# Patient Record
Sex: MALE | Race: WHITE
[De-identification: names, ages, dates, MRNs, and addresses within clinical notes are randomized per-mention and may not be internally consistent; named-entity substitution may affect disease eponyms.]

---

## 2018-06-25 ENCOUNTER — HOSPITAL ENCOUNTER (EMERGENCY)
Dept: HOSPITAL 83 - ED | Age: 48
Discharge: HOME | End: 2018-06-25
Payer: COMMERCIAL

## 2018-06-25 VITALS — HEIGHT: 70 IN | WEIGHT: 188 LBS | BODY MASS INDEX: 26.92 KG/M2

## 2018-06-25 VITALS — BODY MASS INDEX: 26.92 KG/M2 | WEIGHT: 188 LBS | HEIGHT: 70 IN
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
## 2018-06-25 DIAGNOSIS — S51.851A: Primary | ICD-10-CM
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
## 2018-06-27 LAB — HEPATITIS C VIRUS ANTIBODY: <0.1 S/CO (ref 0–0.9)

## 2019-01-16 ENCOUNTER — HOSPITAL ENCOUNTER (EMERGENCY)
Dept: HOSPITAL 83 - ED | Age: 49
Discharge: HOME | End: 2019-01-16
Payer: COMMERCIAL

## 2019-01-16 VITALS — WEIGHT: 192 LBS | BODY MASS INDEX: 36.25 KG/M2 | HEIGHT: 60.98 IN
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
## 2019-01-16 DIAGNOSIS — H57.89: Primary | ICD-10-CM

## 2019-01-16 DIAGNOSIS — H53.8: ICD-10-CM
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
## 2019-01-16 DIAGNOSIS — H57.13: ICD-10-CM
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
## 2019-07-17 ENCOUNTER — HOSPITAL ENCOUNTER (EMERGENCY)
Dept: HOSPITAL 83 - ED | Age: 49
Discharge: HOME | End: 2019-07-17
Payer: COMMERCIAL

## 2019-07-17 VITALS — HEIGHT: 60 IN | WEIGHT: 192 LBS

## 2019-07-17 DIAGNOSIS — Z79.899: ICD-10-CM

## 2019-07-17 DIAGNOSIS — Z79.2: ICD-10-CM

## 2019-07-17 DIAGNOSIS — L25.9: Primary | ICD-10-CM

## 2019-10-24 ENCOUNTER — HOSPITAL ENCOUNTER (INPATIENT)
Dept: HOSPITAL 83 - ED | Age: 49
LOS: 3 days | Discharge: HOME | DRG: 282 | End: 2019-10-27
Attending: INTERNAL MEDICINE | Admitting: INTERNAL MEDICINE
Payer: COMMERCIAL

## 2019-10-24 VITALS — DIASTOLIC BLOOD PRESSURE: 64 MMHG | SYSTOLIC BLOOD PRESSURE: 118 MMHG

## 2019-10-24 VITALS — BODY MASS INDEX: 27.3 KG/M2 | DIASTOLIC BLOOD PRESSURE: 71 MMHG | WEIGHT: 195 LBS | HEIGHT: 70.98 IN

## 2019-10-24 VITALS — SYSTOLIC BLOOD PRESSURE: 126 MMHG | DIASTOLIC BLOOD PRESSURE: 55 MMHG

## 2019-10-24 VITALS — SYSTOLIC BLOOD PRESSURE: 144 MMHG | DIASTOLIC BLOOD PRESSURE: 76 MMHG

## 2019-10-24 DIAGNOSIS — F31.9: ICD-10-CM

## 2019-10-24 DIAGNOSIS — E78.1: ICD-10-CM

## 2019-10-24 DIAGNOSIS — N50.819: ICD-10-CM

## 2019-10-24 DIAGNOSIS — D72.829: ICD-10-CM

## 2019-10-24 DIAGNOSIS — Z83.3: ICD-10-CM

## 2019-10-24 DIAGNOSIS — F41.9: ICD-10-CM

## 2019-10-24 DIAGNOSIS — K85.90: Primary | ICD-10-CM

## 2019-10-24 DIAGNOSIS — F17.200: ICD-10-CM

## 2019-10-24 DIAGNOSIS — R00.1: ICD-10-CM

## 2019-10-24 DIAGNOSIS — E83.41: ICD-10-CM

## 2019-10-24 DIAGNOSIS — Z71.6: ICD-10-CM

## 2019-10-24 DIAGNOSIS — E87.8: ICD-10-CM

## 2019-10-24 LAB
ALBUMIN SERPL-MCNC: 3.9 GM/DL (ref 3.1–4.5)
ALP SERPL-CCNC: 74 U/L (ref 45–117)
ALT SERPL W P-5'-P-CCNC: 24 U/L (ref 12–78)
APPEARANCE UR: CLEAR
APTT PPP: 25.6 SECONDS (ref 20–32.1)
AST SERPL-CCNC: 12 IU/L (ref 3–35)
BACTERIA #/AREA URNS HPF: (no result) /[HPF]
BASOPHILS # BLD AUTO: 0 10*3/UL (ref 0–0.1)
BASOPHILS NFR BLD AUTO: 0.1 % (ref 0–1)
BILIRUB UR QL STRIP: NEGATIVE
BUN SERPL-MCNC: 18 MG/DL (ref 7–24)
CHLORIDE SERPL-SCNC: 110 MMOL/L (ref 98–107)
COLOR UR: YELLOW
CREAT SERPL-MCNC: 0.96 MG/DL (ref 0.7–1.3)
EOSINOPHIL # BLD AUTO: 0 10*3/UL (ref 0–0.4)
EOSINOPHIL # BLD AUTO: 0.1 % (ref 1–4)
ERYTHROCYTE [DISTWIDTH] IN BLOOD BY AUTOMATED COUNT: 12.6 % (ref 0–14.5)
GLUCOSE UR QL: NEGATIVE
HCT VFR BLD AUTO: 50 % (ref 42–52)
HGB BLD-MCNC: 16.8 G/DL (ref 14–18)
HGB UR QL STRIP: NEGATIVE
INR BLD: 0.9 (ref 2–3.5)
KETONES UR QL STRIP: NEGATIVE
LEUKOCYTE ESTERASE UR QL STRIP: NEGATIVE
LIPASE SERPL-CCNC: 1803 U/L (ref 73–393)
LYMPHOCYTES # BLD AUTO: 2.4 10*3/UL (ref 1.3–4.4)
LYMPHOCYTES NFR BLD AUTO: 15.2 % (ref 27–41)
MCH RBC QN AUTO: 30.7 PG (ref 27–31)
MCHC RBC AUTO-ENTMCNC: 33.6 G/DL (ref 33–37)
MCV RBC AUTO: 91.4 FL (ref 80–94)
MONOCYTES # BLD AUTO: 1.1 10*3/UL (ref 0.1–1)
MONOCYTES NFR BLD MANUAL: 6.8 % (ref 3–9)
MUCOUS THREADS URNS QL MICRO: (no result)
NEUT #: 12.1 10*3/UL (ref 2.3–7.9)
NEUT %: 77.2 % (ref 47–73)
NITRITE UR QL STRIP: NEGATIVE
NRBC BLD QL AUTO: 0 % (ref 0–0)
PH UR STRIP: 6 [PH] (ref 5–9)
PLATELET # BLD AUTO: 338 10*3/UL (ref 130–400)
PMV BLD AUTO: 10.9 FL (ref 9.6–12.3)
POTASSIUM SERPL-SCNC: 4 MMOL/L (ref 3.5–5.1)
PROT SERPL-MCNC: 7.3 GM/DL (ref 6.4–8.2)
RBC # BLD AUTO: 5.47 10*6/UL (ref 4.5–5.9)
RBC #/AREA URNS HPF: (no result) RBC/HPF (ref 0–2)
SODIUM SERPL-SCNC: 140 MMOL/L (ref 136–145)
SP GR UR: 1.02 (ref 1–1.03)
TROPONIN I SERPL-MCNC: < 0.015 NG/ML (ref ?–0.04)
UROBILINOGEN UR STRIP-MCNC: 0.2 E.U./DL (ref 0.2–1)
WBC #/AREA URNS HPF: (no result) WBC/HPF (ref 0–5)
WBC NRBC COR # BLD AUTO: 15.7 10*3/UL (ref 4.8–10.8)

## 2019-10-24 NOTE — NUR
PT STATES THAT THE MEDICATION HAS IMPROVED HIS PAIN SLIGHTLY AT THIS TIME. BED
IS IN LOW POSITION. WILL CONTINUE TO MONITOR.

## 2019-10-24 NOTE — NUR
A 49, admitted to 5E, under the
services of SHELLY Pal MD with a diagnosis of PANCREATITIS.
Chief complaint is N/V, EPIGASTRIC PAIN.
Patient arrived via stretcher from ER.
Monitor applied. Initial assessment completed.
Vital signs taken and recorded.
SHELLY PAL MD notified of admission to the unit.
Orders received.
See assessment for past medical history, medications
and allergies.
Patient and/or family oriented to unit.
Clothing/patient valuable form completed.
 
GAYE BRANCH

## 2019-10-24 NOTE — NUR
PATIENT STATES PRESCRIBED MEDS FOR BIPOLAR DISORDER BUT DOES NOT TAKE THEM.
DENIES ANY OTHER HOME MEDICATIONS.

## 2019-10-24 NOTE — NUR
PT RESTING IN BED UNDER WARM BLANKET. NO COMPLAINTS AT THIS TIME. BED IN LOW
POSITION CALL BELL IS WITHIN REACH. SIDE RAILS UP. WILL CONTINUE TO MONITOR.
PENDING CT RESULTS.

## 2019-10-24 NOTE — NUR
PT VS ARE STABLE AT THIS TIME. THE MEDICATION HAS TAKEN THE "EDGE" AWAY. PT IS
DRINKING FOR HIS CT. BED IS IN LOW POSITION. CALL BELL WITHIN REACH. WILL
CONTINUE TO MONITOR.

## 2019-10-25 VITALS — DIASTOLIC BLOOD PRESSURE: 65 MMHG

## 2019-10-25 VITALS — SYSTOLIC BLOOD PRESSURE: 115 MMHG | DIASTOLIC BLOOD PRESSURE: 72 MMHG

## 2019-10-25 VITALS — DIASTOLIC BLOOD PRESSURE: 69 MMHG

## 2019-10-25 VITALS — DIASTOLIC BLOOD PRESSURE: 62 MMHG

## 2019-10-25 LAB
ALBUMIN SERPL-MCNC: 3.6 GM/DL (ref 3.1–4.5)
ALP SERPL-CCNC: 69 U/L (ref 45–117)
ALT SERPL W P-5'-P-CCNC: 22 U/L (ref 12–78)
AST SERPL-CCNC: 8 IU/L (ref 3–35)
BASOPHILS # BLD AUTO: 0 10*3/UL (ref 0–0.1)
BASOPHILS NFR BLD AUTO: 0.1 % (ref 0–1)
BUN SERPL-MCNC: 14 MG/DL (ref 7–24)
CHLORIDE SERPL-SCNC: 109 MMOL/L (ref 98–107)
CHOLEST SERPL-MCNC: 170 MG/DL (ref ?–200)
CREAT SERPL-MCNC: 0.82 MG/DL (ref 0.7–1.3)
EOSINOPHIL # BLD AUTO: 0 % (ref 1–4)
EOSINOPHIL # BLD AUTO: 0 10*3/UL (ref 0–0.4)
ERYTHROCYTE [DISTWIDTH] IN BLOOD BY AUTOMATED COUNT: 13 % (ref 0–14.5)
HCT VFR BLD AUTO: 48.2 % (ref 42–52)
HDLC SERPL-MCNC: 31 MG/DL (ref 40–60)
HGB BLD-MCNC: 15.7 G/DL (ref 14–18)
LDLC SERPL DIRECT ASSAY-MCNC: 106 MG/DL (ref 9–159)
LIPASE SERPL-CCNC: 931 U/L (ref 73–393)
LYMPHOCYTES # BLD AUTO: 3 10*3/UL (ref 1.3–4.4)
LYMPHOCYTES NFR BLD AUTO: 17.9 % (ref 27–41)
MCH RBC QN AUTO: 31.2 PG (ref 27–31)
MCHC RBC AUTO-ENTMCNC: 32.6 G/DL (ref 33–37)
MCV RBC AUTO: 95.6 FL (ref 80–94)
MONOCYTES # BLD AUTO: 1.2 10*3/UL (ref 0.1–1)
MONOCYTES NFR BLD MANUAL: 6.9 % (ref 3–9)
NEUT #: 12.5 10*3/UL (ref 2.3–7.9)
NEUT %: 74.5 % (ref 47–73)
NRBC BLD QL AUTO: 0 10*3/UL (ref 0–0)
PHOSPHATE SERPL-MCNC: 3.5 MG/DL (ref 2.5–4.9)
PLATELET # BLD AUTO: 309 10*3/UL (ref 130–400)
PMV BLD AUTO: 11.2 FL (ref 9.6–12.3)
POTASSIUM SERPL-SCNC: 4.3 MMOL/L (ref 3.5–5.1)
PROT SERPL-MCNC: 6.6 GM/DL (ref 6.4–8.2)
RBC # BLD AUTO: 5.04 10*6/UL (ref 4.5–5.9)
SODIUM SERPL-SCNC: 140 MMOL/L (ref 136–145)
TRIGL SERPL-MCNC: 165 MG/DL (ref ?–150)
VLDLC SERPL CALC-MCNC: 33 MG/DL (ref 6–40)
WBC NRBC COR # BLD AUTO: 16.8 10*3/UL (ref 4.8–10.8)
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
## 2019-10-25 NOTE — NUR
Dr. Guzman in and examined pt. Explained again to pt that due to elevated
lipase that he is not allowed to have anything at this time to eat or drink.
Explained to pt that this is treatment and that he is also receiving ivf.
Notified that pt will have labs rechecked in AM and that they will go from
there. Pt verbalized understanding. Pt also c/o testicular pain. States he was
treated in past for this by Dr. Vaughn. Dr. Guzman examined pt for this as
well. States to order testicular us to r/o hydrocele.

## 2019-10-25 NOTE — NUR
ATTEMPTED TO CALL DR. MORRIS FOR NOTIFICATION OF CONSULT. NO ANSWER LEFT
MESSAGE ON DR. MORRIS CELL THAT HE HAD A NEW CONSULT, ASKED HIM TO CALL 5E TO
OBTAIN DETAILS OF CONSULT. NO SENSITIVE INFORMATION WAS LEFT ON MESSAGE JUST
THAT THERE WAS A NEW CONSULT.
 
HECTOR ULRICH

## 2019-10-25 NOTE — NUR
Pt medicated with morphine for abdominal cramping and pain. Medicated with
zofran iv per prn order for complaints of nausea.

## 2019-10-25 NOTE — NUR
PATIENT RESTING ON BACK, DENIES NEEDS AT THIS TIME. PAIN IS MANAGED, DENIES
ANY N/V. IVF INFUSING PER ORDERS. BED IN LOWEST LOCKED POS, CALL LIGHT WITHIN
REACH, SIDE RAILS UP X2.

## 2019-10-26 VITALS — DIASTOLIC BLOOD PRESSURE: 62 MMHG | SYSTOLIC BLOOD PRESSURE: 113 MMHG

## 2019-10-26 VITALS — SYSTOLIC BLOOD PRESSURE: 118 MMHG | DIASTOLIC BLOOD PRESSURE: 79 MMHG

## 2019-10-26 VITALS — DIASTOLIC BLOOD PRESSURE: 62 MMHG

## 2019-10-26 VITALS — DIASTOLIC BLOOD PRESSURE: 67 MMHG

## 2019-10-26 LAB
ALBUMIN SERPL-MCNC: 3.4 GM/DL (ref 3.1–4.5)
ALP SERPL-CCNC: 62 U/L (ref 45–117)
ALT SERPL W P-5'-P-CCNC: 20 U/L (ref 12–78)
AST SERPL-CCNC: 5 IU/L (ref 3–35)
BASOPHILS # BLD AUTO: 0 10*3/UL (ref 0–0.1)
BASOPHILS NFR BLD AUTO: 0.1 % (ref 0–1)
BUN SERPL-MCNC: 17 MG/DL (ref 7–24)
CHLORIDE SERPL-SCNC: 111 MMOL/L (ref 98–107)
CREAT SERPL-MCNC: 0.79 MG/DL (ref 0.7–1.3)
EOSINOPHIL # BLD AUTO: 0 10*3/UL (ref 0–0.4)
EOSINOPHIL # BLD AUTO: 0.1 % (ref 1–4)
ERYTHROCYTE [DISTWIDTH] IN BLOOD BY AUTOMATED COUNT: 12.6 % (ref 0–14.5)
HCT VFR BLD AUTO: 45.4 % (ref 42–52)
HGB BLD-MCNC: 15.2 G/DL (ref 14–18)
LIPASE SERPL-CCNC: 80 U/L (ref 73–393)
LYMPHOCYTES # BLD AUTO: 2.8 10*3/UL (ref 1.3–4.4)
LYMPHOCYTES NFR BLD AUTO: 21 % (ref 27–41)
MCH RBC QN AUTO: 31.8 PG (ref 27–31)
MCHC RBC AUTO-ENTMCNC: 33.5 G/DL (ref 33–37)
MCV RBC AUTO: 95 FL (ref 80–94)
MONOCYTES # BLD AUTO: 1 10*3/UL (ref 0.1–1)
MONOCYTES NFR BLD MANUAL: 7.2 % (ref 3–9)
NEUT #: 9.5 10*3/UL (ref 2.3–7.9)
NEUT %: 71.2 % (ref 47–73)
NRBC BLD QL AUTO: 0 % (ref 0–0)
PLATELET # BLD AUTO: 264 10*3/UL (ref 130–400)
PMV BLD AUTO: 11 FL (ref 9.6–12.3)
POTASSIUM SERPL-SCNC: 4.2 MMOL/L (ref 3.5–5.1)
PROT SERPL-MCNC: 6.4 GM/DL (ref 6.4–8.2)
RBC # BLD AUTO: 4.78 10*6/UL (ref 4.5–5.9)
SODIUM SERPL-SCNC: 141 MMOL/L (ref 136–145)
WBC NRBC COR # BLD AUTO: 13.4 10*3/UL (ref 4.8–10.8)

## 2019-10-26 NOTE — NUR
Notified pt of new orders for protonix and given at this time. Notified pt
that Dr. Gutierrez stated to me that he will be in later this evening.

## 2019-10-26 NOTE — NUR
Pt c/o heartburn and indigestion after clear liquids. Denies nausea or pain at
this time. Asking if the Dr will be in today.

## 2019-10-26 NOTE — NUR
Notified Dr. Gutierrez of pt c/o indigestion and heart burn after clear liquids.
Order received for protonix iv. Pt had also asked if Dr. Gutierrez would be in to
see him today. I notified Dr. Gutierrez of this and Dr. Gutierrez states he will be in
later this evening.

## 2019-10-27 VITALS — SYSTOLIC BLOOD PRESSURE: 127 MMHG | DIASTOLIC BLOOD PRESSURE: 71 MMHG

## 2019-10-27 VITALS — DIASTOLIC BLOOD PRESSURE: 71 MMHG

## 2019-10-27 LAB
ALBUMIN SERPL-MCNC: 3.6 GM/DL (ref 3.1–4.5)
ALP SERPL-CCNC: 67 U/L (ref 45–117)
ALT SERPL W P-5'-P-CCNC: 40 U/L (ref 12–78)
AST SERPL-CCNC: 20 IU/L (ref 3–35)
BASOPHILS # BLD AUTO: 0 10*3/UL (ref 0–0.1)
BASOPHILS NFR BLD AUTO: 0.2 % (ref 0–1)
BUN SERPL-MCNC: 16 MG/DL (ref 7–24)
CHLORIDE SERPL-SCNC: 109 MMOL/L (ref 98–107)
CREAT SERPL-MCNC: 0.89 MG/DL (ref 0.7–1.3)
EOSINOPHIL # BLD AUTO: 0 10*3/UL (ref 0–0.4)
EOSINOPHIL # BLD AUTO: 0.1 % (ref 1–4)
ERYTHROCYTE [DISTWIDTH] IN BLOOD BY AUTOMATED COUNT: 12.4 % (ref 0–14.5)
HCT VFR BLD AUTO: 46.4 % (ref 42–52)
HGB BLD-MCNC: 15.6 G/DL (ref 14–18)
LYMPHOCYTES # BLD AUTO: 2.9 10*3/UL (ref 1.3–4.4)
LYMPHOCYTES NFR BLD AUTO: 21.2 % (ref 27–41)
MCH RBC QN AUTO: 31.4 PG (ref 27–31)
MCHC RBC AUTO-ENTMCNC: 33.6 G/DL (ref 33–37)
MCV RBC AUTO: 93.4 FL (ref 80–94)
MONOCYTES # BLD AUTO: 1.3 10*3/UL (ref 0.1–1)
MONOCYTES NFR BLD MANUAL: 9.1 % (ref 3–9)
NEUT #: 9.6 10*3/UL (ref 2.3–7.9)
NEUT %: 68.9 % (ref 47–73)
NRBC BLD QL AUTO: 0 % (ref 0–0)
PLATELET # BLD AUTO: 288 10*3/UL (ref 130–400)
PMV BLD AUTO: 11.4 FL (ref 9.6–12.3)
POTASSIUM SERPL-SCNC: 3.9 MMOL/L (ref 3.5–5.1)
PROT SERPL-MCNC: 6.8 GM/DL (ref 6.4–8.2)
RBC # BLD AUTO: 4.97 10*6/UL (ref 4.5–5.9)
SODIUM SERPL-SCNC: 139 MMOL/L (ref 136–145)
WBC NRBC COR # BLD AUTO: 13.9 10*3/UL (ref 4.8–10.8)

## 2019-10-27 NOTE — NUR
Discharge instructions reviewed with patient/family. Patient receptive and
verbalizes understanding. Follow-up care arranged. Written instructions given
to patient/family. HEPLOCK REMOVED 2X2 APPLIED. HOLTER MONITOR REMOVED. PT
AMBULATORY OFF THE FLOOR FOR DISCHARGE.
MONICA JACOBSON

## 2019-10-27 NOTE — NUR
CALLED DR. MATSON MADE AWARE PROTONIX RX THAT WENT TO PHARMACY WAS IV. HE
STATES HE TALKED WITH PHARMACY ALREADY AND TOOK CARE OF IT.

## 2019-10-27 NOTE — NUR
PT SITTING UP IN BED. RESP-EASY AND REGULAR. NO C/O AT THIS TIME. IVF INFUSING
WITH NO PROBLEM. CALL LIGHT IN REACH. SEE SHIFT ASSESSMENT.

## 2019-10-27 NOTE — NUR
DR. KELLY ON FLOOR TO SEE PT. AWARE PT ORTHOSTATIC BP THAT WAS POSITIVE. PT
C/O DIZZINESS WITH MOVEMENT. CALL LIGHT IN REACH.

## 2019-10-27 NOTE — NUR
CALLED DR. MATSON, PT REQUESTING NICOTROL INHALER AND ALSO WANTING TO HAVE
REGULAR DIET. TOLERATED FULL LIQUID WITH NO PROBLEM. ORDERS TAKEN AND
REVIEWED.

## 2019-10-27 NOTE — NUR
IV started left forearm with #22 protective cath after 1  attempts.
Site prepped with Chloroprep.
Sterile dressing applied. Patient tolerated procedure well.
IV infusing at 10O
  cc/hr.
 
GUNJAN SEAY

## 2020-01-16 ENCOUNTER — HOSPITAL ENCOUNTER (OUTPATIENT)
Dept: HOSPITAL 83 - US | Age: 50
Discharge: HOME | End: 2020-01-16
Attending: INTERNAL MEDICINE
Payer: COMMERCIAL

## 2020-01-16 DIAGNOSIS — K76.0: Primary | ICD-10-CM

## 2020-02-20 ENCOUNTER — HOSPITAL ENCOUNTER (EMERGENCY)
Dept: HOSPITAL 83 - ED | Age: 50
Discharge: HOME | End: 2020-02-20
Payer: COMMERCIAL

## 2020-02-20 VITALS — WEIGHT: 198 LBS | BODY MASS INDEX: 28.35 KG/M2 | HEIGHT: 70 IN

## 2020-02-20 DIAGNOSIS — F17.200: ICD-10-CM

## 2020-02-20 DIAGNOSIS — R11.2: ICD-10-CM

## 2020-02-20 DIAGNOSIS — J20.9: Primary | ICD-10-CM

## 2020-02-20 LAB
ALBUMIN SERPL-MCNC: 4.2 GM/DL (ref 3.1–4.5)
ALP SERPL-CCNC: 101 U/L (ref 45–117)
ALT SERPL W P-5'-P-CCNC: 23 U/L (ref 12–78)
APPEARANCE UR: CLEAR
APTT PPP: 28.5 SECONDS (ref 20–32.1)
AST SERPL-CCNC: 11 IU/L (ref 3–35)
BASOPHILS # BLD AUTO: 1 % (ref 0–1)
BILIRUB UR QL STRIP: NEGATIVE
BUN SERPL-MCNC: 9 MG/DL (ref 7–24)
BURR CELLS BLD QL SMEAR: (no result)
CHLORIDE SERPL-SCNC: 106 MMOL/L (ref 98–107)
COLOR UR: YELLOW
CREAT SERPL-MCNC: 0.98 MG/DL (ref 0.7–1.3)
EPI CELLS #/AREA URNS HPF: (no result) /[HPF]
ERYTHROCYTE [DISTWIDTH] IN BLOOD BY AUTOMATED COUNT: 12.5 % (ref 0–14.5)
GLUCOSE UR QL: NEGATIVE
HCT VFR BLD AUTO: 51.1 % (ref 42–52)
HGB BLD-MCNC: 17.1 G/DL (ref 14–18)
HGB UR QL STRIP: NEGATIVE
INR BLD: 0.9 (ref 2–3.5)
KETONES UR QL STRIP: NEGATIVE
LEUKOCYTE ESTERASE UR QL STRIP: NEGATIVE
LIPASE SERPL-CCNC: 138 U/L (ref 73–393)
MCH RBC QN AUTO: 31.4 PG (ref 27–31)
MCHC RBC AUTO-ENTMCNC: 33.5 G/DL (ref 33–37)
MCV RBC AUTO: 93.9 FL (ref 80–94)
NITRITE UR QL STRIP: NEGATIVE
NRBC BLD QL AUTO: 0 10*3/UL (ref 0–0)
PH UR STRIP: 7 [PH] (ref 5–9)
PLATELET # BLD AUTO: 264 10*3/UL (ref 130–400)
PLATELET SUFFICIENCY: NORMAL
PMV BLD AUTO: 11 FL (ref 9.6–12.3)
POTASSIUM SERPL-SCNC: 3.7 MMOL/L (ref 3.5–5.1)
PROT SERPL-MCNC: 7.8 GM/DL (ref 6.4–8.2)
RBC # BLD AUTO: 5.44 10*6/UL (ref 4.5–5.9)
SODIUM SERPL-SCNC: 139 MMOL/L (ref 136–145)
SP GR UR: 1 (ref 1–1.03)
TOTAL CELLS COUNTED: 100 #CELLS
TROPONIN I SERPL-MCNC: < 0.015 NG/ML (ref ?–0.04)
UROBILINOGEN UR STRIP-MCNC: 0.2 E.U./DL (ref 0.2–1)
WBC NRBC COR # BLD AUTO: 11.2 10*3/UL (ref 4.8–10.8)

## 2020-02-24 ENCOUNTER — HOSPITAL ENCOUNTER (EMERGENCY)
Dept: HOSPITAL 83 - ED | Age: 50
Discharge: HOME | End: 2020-02-24
Payer: COMMERCIAL

## 2020-02-24 VITALS — HEIGHT: 71.97 IN | BODY MASS INDEX: 25.73 KG/M2 | WEIGHT: 190 LBS

## 2020-02-24 DIAGNOSIS — L23.7: Primary | ICD-10-CM

## 2020-03-01 ENCOUNTER — HOSPITAL ENCOUNTER (EMERGENCY)
Dept: HOSPITAL 83 - ED | Age: 50
Discharge: HOME | End: 2020-03-01
Payer: COMMERCIAL

## 2020-03-01 VITALS — WEIGHT: 190 LBS | BODY MASS INDEX: 26.6 KG/M2 | HEIGHT: 70.98 IN

## 2020-03-01 DIAGNOSIS — L23.9: Primary | ICD-10-CM

## 2020-03-01 DIAGNOSIS — Z79.2: ICD-10-CM

## 2020-03-01 DIAGNOSIS — Z79.899: ICD-10-CM

## 2020-06-11 ENCOUNTER — HOSPITAL ENCOUNTER (EMERGENCY)
Dept: HOSPITAL 83 - ED | Age: 50
Discharge: HOME | End: 2020-06-11
Payer: COMMERCIAL

## 2020-06-11 VITALS — BODY MASS INDEX: 25.2 KG/M2 | WEIGHT: 180 LBS | HEIGHT: 70.98 IN

## 2020-06-11 DIAGNOSIS — F17.200: ICD-10-CM

## 2020-06-11 DIAGNOSIS — R11.10: ICD-10-CM

## 2020-06-11 DIAGNOSIS — R10.13: ICD-10-CM

## 2020-06-11 DIAGNOSIS — J03.90: Primary | ICD-10-CM

## 2020-06-11 LAB
ALBUMIN SERPL-MCNC: 3.9 GM/DL (ref 3.1–4.5)
ALP SERPL-CCNC: 86 U/L (ref 45–117)
ALT SERPL W P-5'-P-CCNC: 24 U/L (ref 12–78)
AMPHETAMINES UR QL SCN: < 1000
AST SERPL-CCNC: 14 IU/L (ref 3–35)
BARBITURATES UR QL SCN: < 200
BASOPHILS # BLD AUTO: 0.1 10*3/UL (ref 0–0.1)
BASOPHILS NFR BLD AUTO: 1 % (ref 0–1)
BENZODIAZ UR QL SCN: < 200
BUN SERPL-MCNC: 15 MG/DL (ref 7–24)
BZE UR QL SCN: < 300
CANNABINOIDS UR QL SCN: > 50
CHLORIDE SERPL-SCNC: 108 MMOL/L (ref 98–107)
CREAT SERPL-MCNC: 1 MG/DL (ref 0.7–1.3)
EOSINOPHIL # BLD AUTO: 0.2 10*3/UL (ref 0–0.4)
EOSINOPHIL # BLD AUTO: 2 % (ref 1–4)
ERYTHROCYTE [DISTWIDTH] IN BLOOD BY AUTOMATED COUNT: 12.6 % (ref 0–14.5)
ETHANOL SERPL-MCNC: < 3 MG/DL (ref ?–3)
HCT VFR BLD AUTO: 48.2 % (ref 42–52)
INR BLD: 1 (ref 2–3.5)
LIPASE SERPL-CCNC: 71 U/L (ref 73–393)
LYMPHOCYTES # BLD AUTO: 4.5 10*3/UL (ref 1.3–4.4)
LYMPHOCYTES NFR BLD AUTO: 38.9 % (ref 27–41)
MCH RBC QN AUTO: 30.8 PG (ref 27–31)
MCHC RBC AUTO-ENTMCNC: 33.2 G/DL (ref 33–37)
MCV RBC AUTO: 92.9 FL (ref 80–94)
METHADONE UR QL SCN: < 300
MONOCYTES # BLD AUTO: 1 10*3/UL (ref 0.1–1)
MONOCYTES NFR BLD MANUAL: 8.5 % (ref 3–9)
NEUT #: 5.7 10*3/UL (ref 2.3–7.9)
NEUT %: 49.4 % (ref 47–73)
NRBC BLD QL AUTO: 0 % (ref 0–0)
OPIATES UR QL SCN: < 300
PCP UR QL SCN: <  25
PLATELET # BLD AUTO: 310 10*3/UL (ref 130–400)
PMV BLD AUTO: 10.7 FL (ref 9.6–12.3)
POTASSIUM SERPL-SCNC: 3.7 MMOL/L (ref 3.5–5.1)
PROT SERPL-MCNC: 7.3 GM/DL (ref 6.4–8.2)
RBC # BLD AUTO: 5.19 10*6/UL (ref 4.5–5.9)
SODIUM SERPL-SCNC: 140 MMOL/L (ref 136–145)
TROPONIN I SERPL-MCNC: < 0.015 NG/ML (ref ?–0.04)
WBC NRBC COR # BLD AUTO: 11.5 10*3/UL (ref 4.8–10.8)

## 2020-06-29 ENCOUNTER — HOSPITAL ENCOUNTER (EMERGENCY)
Dept: HOSPITAL 83 - ED | Age: 50
Discharge: HOME | End: 2020-06-29
Payer: COMMERCIAL

## 2020-06-29 VITALS — WEIGHT: 190 LBS | HEIGHT: 60 IN
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
## 2020-06-29 DIAGNOSIS — S39.012A: Primary | ICD-10-CM
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
## 2020-06-29 DIAGNOSIS — Z87.891: ICD-10-CM

## 2020-06-29 DIAGNOSIS — M54.42: ICD-10-CM

## 2020-09-24 ENCOUNTER — HOSPITAL ENCOUNTER (EMERGENCY)
Dept: HOSPITAL 83 - ED | Age: 50
Discharge: HOME | End: 2020-09-24
Payer: COMMERCIAL

## 2020-09-24 VITALS — BODY MASS INDEX: 28 KG/M2 | HEIGHT: 70.98 IN | WEIGHT: 200 LBS

## 2020-09-24 DIAGNOSIS — I88.9: Primary | ICD-10-CM

## 2020-10-08 ENCOUNTER — HOSPITAL ENCOUNTER (OUTPATIENT)
Dept: HOSPITAL 83 - RAD/SH | Age: 50
Discharge: HOME | End: 2020-10-08
Attending: INTERNAL MEDICINE
Payer: COMMERCIAL

## 2020-10-08 DIAGNOSIS — R13.10: Primary | ICD-10-CM

## 2020-10-08 NOTE — NUR
SPEECH PATHOLOGY
Outpatient MBS completed as per orders to view the oral and pharyngeal phases
of the swallow. Patient was alert, cooperative and reported that for about the
past year, he has had the sensation that food is sticking in his throat,
causing him to vomit it back up.  He stated that he was recently placed on a
steroid and antibiotic for a throat infection, but that this problem had been
occurring even prior to the infection. No significant medical history was
reported.  Patient stated that it feels like his throat is swollen on both
sides. Respiratory status was WNL. Oral exam revealed edentulous status but
lingual/labial/buccal skills WNL in terms of strength, ROM and coordination.
Patient was assessed with puree, solids and thin liquid. Oral and pharyngeal
swallowing skills were WNL. Due to the symptoms and difficulties he is
presenting with, recommend an ENT consult. Recommend he remain on present diet
with use of universal safe swallow precautions. Results and patsy. were shared
with the patient who verbalized understanding. Dictated report to follow.
Thank you for this referral.
 
JESSENIA CHAN [id number]-SLP

## 2021-06-28 ENCOUNTER — HOSPITAL ENCOUNTER (OUTPATIENT)
Dept: HOSPITAL 83 - RAD | Age: 51
Discharge: HOME | End: 2021-06-28
Attending: FAMILY MEDICINE
Payer: COMMERCIAL

## 2021-06-28 DIAGNOSIS — J44.9: Primary | ICD-10-CM

## 2021-07-28 ENCOUNTER — HOSPITAL ENCOUNTER (EMERGENCY)
Dept: HOSPITAL 83 - ED | Age: 51
Discharge: HOME | End: 2021-07-28
Payer: COMMERCIAL

## 2021-07-28 VITALS — HEIGHT: 60 IN

## 2021-07-28 DIAGNOSIS — L23.7: Primary | ICD-10-CM

## 2021-11-24 ENCOUNTER — HOSPITAL ENCOUNTER (EMERGENCY)
Dept: HOSPITAL 83 - ED | Age: 51
Discharge: HOME | End: 2021-11-24
Payer: COMMERCIAL

## 2021-11-24 VITALS — WEIGHT: 186 LBS | BODY MASS INDEX: 26.04 KG/M2 | HEIGHT: 70.98 IN

## 2021-11-24 DIAGNOSIS — K29.00: Primary | ICD-10-CM

## 2021-11-24 LAB
ALBUMIN SERPL-MCNC: 3.4 GM/DL (ref 3.1–4.5)
ALP SERPL-CCNC: 76 U/L (ref 45–117)
ALT SERPL W P-5'-P-CCNC: 31 U/L (ref 12–78)
AST SERPL-CCNC: 18 IU/L (ref 3–35)
BASOPHILS # BLD AUTO: 0 10*3/UL (ref 0–0.1)
BASOPHILS NFR BLD AUTO: 0.7 % (ref 0–1)
BUN SERPL-MCNC: 11 MG/DL (ref 7–24)
CHLORIDE SERPL-SCNC: 110 MMOL/L (ref 98–107)
CREAT SERPL-MCNC: 0.8 MG/DL (ref 0.7–1.3)
EOSINOPHIL # BLD AUTO: 0.1 10*3/UL (ref 0–0.4)
EOSINOPHIL # BLD AUTO: 0.9 % (ref 1–4)
ERYTHROCYTE [DISTWIDTH] IN BLOOD BY AUTOMATED COUNT: 12.7 % (ref 0–14.5)
HCT VFR BLD AUTO: 49.2 % (ref 42–52)
LIPASE SERPL-CCNC: 116 U/L (ref 73–393)
LYMPHOCYTES # BLD AUTO: 2.9 10*3/UL (ref 1.3–4.4)
LYMPHOCYTES NFR BLD AUTO: 50 % (ref 27–41)
MCH RBC QN AUTO: 31.1 PG (ref 27–31)
MCHC RBC AUTO-ENTMCNC: 33.5 G/DL (ref 33–37)
MCV RBC AUTO: 92.8 FL (ref 80–94)
MONOCYTES # BLD AUTO: 0.7 10*3/UL (ref 0.1–1)
MONOCYTES NFR BLD MANUAL: 12.4 % (ref 3–9)
NEUT #: 2.1 10*3/UL (ref 2.3–7.9)
NEUT %: 35.8 % (ref 47–73)
NRBC BLD QL AUTO: 0 % (ref 0–0)
PLATELET # BLD AUTO: 196 10*3/UL (ref 130–400)
PMV BLD AUTO: 10.9 FL (ref 9.6–12.3)
POTASSIUM SERPL-SCNC: 4 MMOL/L (ref 3.5–5.1)
PROT SERPL-MCNC: 7 GM/DL (ref 6.4–8.2)
RBC # BLD AUTO: 5.3 10*6/UL (ref 4.5–5.9)
SODIUM SERPL-SCNC: 140 MMOL/L (ref 136–145)
WBC NRBC COR # BLD AUTO: 5.7 10*3/UL (ref 4.8–10.8)

## 2022-03-24 ENCOUNTER — HOSPITAL ENCOUNTER (OUTPATIENT)
Dept: HOSPITAL 83 - RAD | Age: 52
Discharge: HOME | End: 2022-03-24
Attending: INTERNAL MEDICINE
Payer: COMMERCIAL

## 2022-03-24 DIAGNOSIS — M51.37: Primary | ICD-10-CM

## 2022-10-15 ENCOUNTER — HOSPITAL ENCOUNTER (EMERGENCY)
Dept: HOSPITAL 83 - ED | Age: 52
Discharge: HOME | End: 2022-10-15
Payer: COMMERCIAL

## 2022-10-15 VITALS — BODY MASS INDEX: 23.1 KG/M2 | HEIGHT: 70.98 IN | WEIGHT: 165 LBS

## 2022-10-15 DIAGNOSIS — M54.12: Primary | ICD-10-CM

## 2022-10-15 DIAGNOSIS — F17.200: ICD-10-CM

## 2023-08-24 ENCOUNTER — HOSPITAL ENCOUNTER (EMERGENCY)
Dept: HOSPITAL 83 - ED | Age: 53
Discharge: HOME | End: 2023-08-24
Payer: COMMERCIAL

## 2023-08-24 VITALS — WEIGHT: 180 LBS | BODY MASS INDEX: 25.77 KG/M2 | HEIGHT: 70 IN

## 2023-08-24 DIAGNOSIS — J02.8: Primary | ICD-10-CM

## 2023-08-24 DIAGNOSIS — Z98.890: ICD-10-CM

## 2023-08-24 DIAGNOSIS — F17.200: ICD-10-CM

## 2023-08-24 DIAGNOSIS — K21.9: ICD-10-CM

## 2023-08-24 DIAGNOSIS — Z79.899: ICD-10-CM

## 2023-08-24 LAB
ALP SERPL-CCNC: 96 U/L (ref 46–116)
ALT SERPL W P-5'-P-CCNC: 15 U/L (ref 10–49)
APTT PPP: 28.6 SECONDS (ref 20–32.1)
BASOPHILS # BLD AUTO: 0.1 10*3/UL (ref 0–0.1)
BASOPHILS NFR BLD AUTO: 0.8 % (ref 0–1)
BUN SERPL-MCNC: 12 MG/DL (ref 9–23)
CHLORIDE SERPL-SCNC: 106 MMOL/L (ref 98–107)
EOSINOPHIL # BLD AUTO: 0.3 10*3/UL (ref 0–0.4)
EOSINOPHIL # BLD AUTO: 2.5 % (ref 1–4)
ERYTHROCYTE [DISTWIDTH] IN BLOOD BY AUTOMATED COUNT: 12.8 % (ref 0–14.5)
HCT VFR BLD AUTO: 45.6 % (ref 42–52)
INR BLD: 1 (ref 2–3.5)
LIPASE SERPL-CCNC: 59 U/L (ref 12–53)
LYMPHOCYTES # BLD AUTO: 2 10*3/UL (ref 1.3–4.4)
LYMPHOCYTES NFR BLD AUTO: 16.1 % (ref 27–41)
MCH RBC QN AUTO: 31.7 PG (ref 27–31)
MCHC RBC AUTO-ENTMCNC: 34 G/DL (ref 33–37)
MCV RBC AUTO: 93.3 FL (ref 80–94)
MONOCYTES # BLD AUTO: 0.9 10*3/UL (ref 0.1–1)
MONOCYTES NFR BLD MANUAL: 7.1 % (ref 3–9)
NEUT #: 9.2 10*3/UL (ref 2.3–7.9)
NEUT %: 72.3 % (ref 47–73)
NRBC BLD QL AUTO: 0 10*3/UL (ref 0–0)
PLATELET # BLD AUTO: 257 10*3/UL (ref 130–400)
PMV BLD AUTO: 10.6 FL (ref 9.6–12.3)
POTASSIUM SERPL-SCNC: 3.7 MMOL/L (ref 3.4–5.1)
PROT SERPL-MCNC: 6.8 GM/DL (ref 6–8)
RBC # BLD AUTO: 4.89 10*6/UL (ref 4.5–5.9)
WBC NRBC COR # BLD AUTO: 12.7 10*3/UL (ref 4.8–10.8)

## 2023-09-06 ENCOUNTER — HOSPITAL ENCOUNTER (EMERGENCY)
Dept: HOSPITAL 83 - ED | Age: 53
Discharge: HOME | End: 2023-09-06
Payer: COMMERCIAL

## 2023-09-06 VITALS — BODY MASS INDEX: 26.63 KG/M2 | HEIGHT: 70 IN | WEIGHT: 186 LBS
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
## 2023-09-06 DIAGNOSIS — Z98.890: ICD-10-CM

## 2023-09-06 DIAGNOSIS — S30.0XXA: Primary | ICD-10-CM

## 2023-09-06 DIAGNOSIS — F17.200: ICD-10-CM

## 2023-09-06 LAB
ALP SERPL-CCNC: 91 U/L (ref 46–116)
ALT SERPL W P-5'-P-CCNC: 17 U/L (ref 10–49)
APTT PPP: 28.5 SECONDS (ref 20–32.1)
BASOPHILS # BLD AUTO: 0.1 10*3/UL (ref 0–0.1)
BASOPHILS NFR BLD AUTO: 0.9 % (ref 0–1)
BUN SERPL-MCNC: 8 MG/DL (ref 9–23)
CHLORIDE SERPL-SCNC: 108 MMOL/L (ref 98–107)
EOSINOPHIL # BLD AUTO: 0.2 10*3/UL (ref 0–0.4)
EOSINOPHIL # BLD AUTO: 2.1 % (ref 1–4)
ERYTHROCYTE [DISTWIDTH] IN BLOOD BY AUTOMATED COUNT: 12.7 % (ref 0–14.5)
HCT VFR BLD AUTO: 47.1 % (ref 42–52)
INR BLD: 1 (ref 2–3.5)
LIPASE SERPL-CCNC: 48 U/L (ref 12–53)
LYMPHOCYTES # BLD AUTO: 4.6 10*3/UL (ref 1.3–4.4)
LYMPHOCYTES NFR BLD AUTO: 45 % (ref 27–41)
MCH RBC QN AUTO: 31.4 PG (ref 27–31)
MCHC RBC AUTO-ENTMCNC: 34.4 G/DL (ref 33–37)
MCV RBC AUTO: 91.3 FL (ref 80–94)
MONOCYTES # BLD AUTO: 0.9 10*3/UL (ref 0.1–1)
MONOCYTES NFR BLD MANUAL: 8.8 % (ref 3–9)
NEUT #: 4.3 10*3/UL (ref 2.3–7.9)
NEUT %: 42.9 % (ref 47–73)
NRBC BLD QL AUTO: 0 10*3/UL (ref 0–0)
PLATELET # BLD AUTO: 286 10*3/UL (ref 130–400)
PMV BLD AUTO: 10.6 FL (ref 9.6–12.3)
POTASSIUM SERPL-SCNC: 4.1 MMOL/L (ref 3.4–5.1)
PROT SERPL-MCNC: 6.6 GM/DL (ref 6–8)
RBC # BLD AUTO: 5.16 10*6/UL (ref 4.5–5.9)
WBC NRBC COR # BLD AUTO: 10.1 10*3/UL (ref 4.8–10.8)

## 2024-08-16 ENCOUNTER — HOSPITAL ENCOUNTER (EMERGENCY)
Dept: HOSPITAL 83 - ED | Age: 54
Discharge: HOME | End: 2024-08-16
Payer: COMMERCIAL

## 2024-08-16 VITALS — BODY MASS INDEX: 27.49 KG/M2 | HEIGHT: 70 IN | WEIGHT: 192 LBS

## 2024-08-16 DIAGNOSIS — E83.41: ICD-10-CM

## 2024-08-16 DIAGNOSIS — L23.7: Primary | ICD-10-CM

## 2024-08-16 DIAGNOSIS — Z98.890: ICD-10-CM

## 2024-08-16 DIAGNOSIS — F41.9: ICD-10-CM

## 2024-08-16 DIAGNOSIS — E78.00: ICD-10-CM

## 2024-08-16 DIAGNOSIS — F17.200: ICD-10-CM

## 2024-11-07 ENCOUNTER — HOSPITAL ENCOUNTER (EMERGENCY)
Dept: HOSPITAL 83 - ED | Age: 54
Discharge: HOME | End: 2024-11-07
Payer: COMMERCIAL

## 2024-11-07 VITALS — HEIGHT: 70 IN | WEIGHT: 190 LBS | BODY MASS INDEX: 27.2 KG/M2

## 2024-11-07 DIAGNOSIS — R10.13: ICD-10-CM
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
## 2024-11-07 DIAGNOSIS — S43.401A: Primary | ICD-10-CM

## 2024-11-07 DIAGNOSIS — F12.90: ICD-10-CM
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
## 2024-11-07 DIAGNOSIS — R11.2: ICD-10-CM
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
## 2024-11-07 DIAGNOSIS — F17.200: ICD-10-CM

## 2024-11-07 DIAGNOSIS — Z98.890: ICD-10-CM
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
## 2024-11-07 LAB
BASOPHILS # BLD AUTO: 0.1 10*3/UL (ref 0–0.1)
BASOPHILS NFR BLD AUTO: 1.1 % (ref 0–1)
BUN SERPL-MCNC: 9 MG/DL (ref 9–23)
CHLORIDE SERPL-SCNC: 109 MMOL/L (ref 98–107)
EOSINOPHIL # BLD AUTO: 0.2 10*3/UL (ref 0–0.4)
EOSINOPHIL # BLD AUTO: 1.9 % (ref 1–4)
ERYTHROCYTE [DISTWIDTH] IN BLOOD BY AUTOMATED COUNT: 12.6 % (ref 0–14.5)
HCT VFR BLD AUTO: 48.4 % (ref 42–52)
LIPASE SERPL-CCNC: 37 U/L (ref 12–53)
MCH RBC QN AUTO: 30.8 PG (ref 27–31)
MCHC RBC AUTO-ENTMCNC: 33.5 G/DL (ref 33–37)
MCV RBC AUTO: 92 FL (ref 80–94)
MONOCYTES # BLD AUTO: 0.9 10*3/UL (ref 0.1–1)
MONOCYTES NFR BLD MANUAL: 8.2 % (ref 3–9)
NEUT #: 5.9 10*3/UL (ref 2.3–7.9)
NEUT %: 55 % (ref 47–73)
NRBC BLD QL AUTO: 0 10*3/UL (ref 0–0)
PLATELET # BLD AUTO: 311 10*3/UL (ref 130–400)
PMV BLD AUTO: 10.2 FL (ref 9.6–12.3)
POTASSIUM SERPL-SCNC: 4 MMOL/L (ref 3.4–5.1)
RBC # BLD AUTO: 5.26 10*6/UL (ref 4.5–5.9)
WBC NRBC COR # BLD AUTO: 10.7 10*3/UL (ref 4.8–10.8)

## 2025-02-17 ENCOUNTER — HOSPITAL ENCOUNTER (OUTPATIENT)
Dept: HOSPITAL 83 - ED | Age: 55
Setting detail: OBSERVATION
LOS: 1 days | Discharge: HOME | End: 2025-02-18
Attending: INTERNAL MEDICINE | Admitting: INTERNAL MEDICINE
Payer: COMMERCIAL

## 2025-02-17 VITALS — DIASTOLIC BLOOD PRESSURE: 72 MMHG

## 2025-02-17 VITALS — HEIGHT: 70 IN | WEIGHT: 204 LBS | BODY MASS INDEX: 29.2 KG/M2

## 2025-02-17 DIAGNOSIS — Z71.6: ICD-10-CM

## 2025-02-17 DIAGNOSIS — F17.220: ICD-10-CM

## 2025-02-17 DIAGNOSIS — J44.9: ICD-10-CM

## 2025-02-17 DIAGNOSIS — Z79.899: ICD-10-CM

## 2025-02-17 DIAGNOSIS — R07.89: Primary | ICD-10-CM

## 2025-02-17 LAB
BASOPHILS # BLD AUTO: 2 % (ref 0–1)
BUN SERPL-MCNC: 10 MG/DL (ref 9–23)
CHLORIDE SERPL-SCNC: 105 MMOL/L (ref 98–107)
ERYTHROCYTE [DISTWIDTH] IN BLOOD BY AUTOMATED COUNT: 12.8 % (ref 0–14.5)
HCT VFR BLD AUTO: 47.3 % (ref 42–52)
MANUAL DIFFERENTIAL PERFORMED BLD QL: YES
MCH RBC QN AUTO: 31.5 PG (ref 27–31)
MCHC RBC AUTO-ENTMCNC: 34.9 G/DL (ref 33–37)
MCV RBC AUTO: 90.4 FL (ref 80–94)
NRBC BLD QL AUTO: 0 % (ref 0–0)
PLATELET # BLD AUTO: 329 10*3/UL (ref 130–400)
PLATELET SUFFICIENCY: NORMAL
PMV BLD AUTO: 10.5 FL (ref 9.6–12.3)
POTASSIUM SERPL-SCNC: 3.8 MMOL/L (ref 3.4–5.1)
RBC # BLD AUTO: 5.23 10*6/UL (ref 4.5–5.9)
TOTAL CELLS COUNTED: 100 #CELLS
WBC NRBC COR # BLD AUTO: 12.6 10*3/UL (ref 4.8–10.8)

## 2025-02-18 VITALS — SYSTOLIC BLOOD PRESSURE: 118 MMHG | DIASTOLIC BLOOD PRESSURE: 66 MMHG

## 2025-02-18 VITALS — DIASTOLIC BLOOD PRESSURE: 65 MMHG | SYSTOLIC BLOOD PRESSURE: 115 MMHG

## 2025-02-18 VITALS — DIASTOLIC BLOOD PRESSURE: 76 MMHG
